# Patient Record
(demographics unavailable — no encounter records)

---

## 2024-11-26 NOTE — PHYSICAL EXAM
[de-identified] : PHYSICAL EXAM  Constitutional:  Appears well, no apparent distress Ability to communicate: Normal Respiratory: non-labored breathing Skin: no rash noted Head: normocephalic, atraumatic Neck: no visible thyroid enlargement Eyes: extraocular movements intact Neurologic: alert and oriented x3 Psychiatric: normal mood, affect, and behavior  Lumbar Spine:  Palpation: right lumbar paraspinal spasm and right lumbar paraspinal tenderness to palpation. ROM: Diminished range of motion in all plains.  Patient notes pain with lateral bending to the right. MMT: Motor exam is 5/5 through out bilateral lower extremities. Sensation: Light touch and pain is intact throughout bilateral lower extremities. Reflexes: achilles and patella reflexes are intact and  symmetrical.  No sustained clonus. Special Testing: Positive straight leg raise on the right side.

## 2024-11-26 NOTE — HISTORY OF PRESENT ILLNESS
[Lower back] : lower back [10] : 10 [5] : 5 [Burning] : burning [Radiating] : radiating [Shooting] : shooting [Tingling] : tingling [Intermittent] : intermittent [Rest] : rest [Standing] : standing [Walking] : walking [FreeTextEntry1] : pt states he is having pain in the right leg , when he walk he states the back of the thigh tightness up and in the lower back it shoots down to the right side  [] : no [FreeTextEntry6] : numbness  [FreeTextEntry7] : right side into the leg , thigh  [de-identified] : driving , getting out of the car

## 2024-11-26 NOTE — PHYSICAL EXAM
[de-identified] : PHYSICAL EXAM  Constitutional:  Appears well, no apparent distress Ability to communicate: Normal Respiratory: non-labored breathing Skin: no rash noted Head: normocephalic, atraumatic Neck: no visible thyroid enlargement Eyes: extraocular movements intact Neurologic: alert and oriented x3 Psychiatric: normal mood, affect, and behavior  Lumbar Spine:  Palpation: right lumbar paraspinal spasm and right lumbar paraspinal tenderness to palpation. ROM: Diminished range of motion in all plains.  Patient notes pain with lateral bending to the right. MMT: Motor exam is 5/5 through out bilateral lower extremities. Sensation: Light touch and pain is intact throughout bilateral lower extremities. Reflexes: achilles and patella reflexes are intact and  symmetrical.  No sustained clonus. Special Testing: Positive straight leg raise on the right side.

## 2024-11-26 NOTE — HISTORY OF PRESENT ILLNESS
[Lower back] : lower back [10] : 10 [5] : 5 [Burning] : burning [Radiating] : radiating [Shooting] : shooting [Tingling] : tingling [Intermittent] : intermittent [Rest] : rest [Standing] : standing [Walking] : walking [FreeTextEntry1] : pt states he is having pain in the right leg , when he walk he states the back of the thigh tightness up and in the lower back it shoots down to the right side  [] : no [FreeTextEntry6] : numbness  [FreeTextEntry7] : right side into the leg , thigh  [de-identified] : driving , getting out of the car

## 2024-12-11 NOTE — HISTORY OF PRESENT ILLNESS
[Lower back] : lower back [10] : 10 [5] : 5 [Burning] : burning [Radiating] : radiating [Shooting] : shooting [Tingling] : tingling [Intermittent] : intermittent [Rest] : rest [Standing] : standing [Walking] : walking [FreeTextEntry1] : pt is following up for mri results, MD will review images and report  [] : no [FreeTextEntry6] : numbness  [FreeTextEntry7] : right side into the leg , thigh  [de-identified] : driving , getting out of the car

## 2024-12-11 NOTE — HISTORY OF PRESENT ILLNESS
[Lower back] : lower back [10] : 10 [5] : 5 [Burning] : burning [Radiating] : radiating [Shooting] : shooting [Tingling] : tingling [Intermittent] : intermittent [Rest] : rest [Standing] : standing [Walking] : walking [FreeTextEntry1] : pt is following up for mri results, MD will review images and report  [] : no [FreeTextEntry6] : numbness  [FreeTextEntry7] : right side into the leg , thigh  [de-identified] : driving , getting out of the car

## 2024-12-11 NOTE — DISCUSSION/SUMMARY
[de-identified] : told pt to do PT and take tylenol prn advised that  he not take meloxicam daily on long term basis

## 2024-12-11 NOTE — DISCUSSION/SUMMARY
[de-identified] : told pt to do PT and take tylenol prn advised that  he not take meloxicam daily on long term basis

## 2024-12-11 NOTE — PHYSICAL EXAM
[de-identified] : PHYSICAL EXAM  Constitutional:  Appears well, no apparent distress Ability to communicate: Normal Respiratory: non-labored breathing Skin: no rash noted Head: normocephalic, atraumatic Neck: no visible thyroid enlargement Eyes: extraocular movements intact Neurologic: alert and oriented x3 Psychiatric: normal mood, affect, and behavior  Lumbar Spine:  Palpation: right lumbar paraspinal spasm and right lumbar paraspinal tenderness to palpation. ROM: Diminished range of motion in all plains.  Patient notes pain with lateral bending to the right. MMT: Motor exam is 5/5 through out bilateral lower extremities. Sensation: Light touch and pain is intact throughout bilateral lower extremities. Reflexes: achilles and patella reflexes are intact and  symmetrical.  No sustained clonus. Special Testing: Positive straight leg raise on the right side.

## 2024-12-11 NOTE — PHYSICAL EXAM
[de-identified] : PHYSICAL EXAM  Constitutional:  Appears well, no apparent distress Ability to communicate: Normal Respiratory: non-labored breathing Skin: no rash noted Head: normocephalic, atraumatic Neck: no visible thyroid enlargement Eyes: extraocular movements intact Neurologic: alert and oriented x3 Psychiatric: normal mood, affect, and behavior  Lumbar Spine:  Palpation: right lumbar paraspinal spasm and right lumbar paraspinal tenderness to palpation. ROM: Diminished range of motion in all plains.  Patient notes pain with lateral bending to the right. MMT: Motor exam is 5/5 through out bilateral lower extremities. Sensation: Light touch and pain is intact throughout bilateral lower extremities. Reflexes: achilles and patella reflexes are intact and  symmetrical.  No sustained clonus. Special Testing: Positive straight leg raise on the right side.

## 2025-03-31 NOTE — PHYSICAL EXAM
[de-identified] : PHYSICAL EXAM  Constitutional:  Appears well, no apparent distress Ability to communicate: Normal Respiratory: non-labored breathing Skin: no rash noted Head: normocephalic, atraumatic Neck: no visible thyroid enlargement Eyes: extraocular movements intact Neurologic: alert and oriented x3 Psychiatric: normal mood, affect, and behavior  Lumbar Spine:  Palpation: right lumbar paraspinal spasm and right lumbar paraspinal tenderness to palpation. ROM: Diminished range of motion in all plains.  Patient notes pain with lateral bending to the right. MMT: Motor exam is 5/5 through out bilateral lower extremities. Sensation: Light touch and pain is intact throughout bilateral lower extremities. Reflexes: achilles and patella reflexes are intact and  symmetrical.  No sustained clonus. Special Testing: Positive straight leg raise on the right side.  Assessemnt: Radiculopathy of lumbosacral region (M54.17) Myalgia (M79.10)

## 2025-03-31 NOTE — HISTORY OF PRESENT ILLNESS
[Lower back] : lower back [10] : 10 [5] : 5 [Burning] : burning [Radiating] : radiating [Shooting] : shooting [Tingling] : tingling [Rest] : rest [Standing] : standing [Walking] : walking [Constant] : constant [Bending forward] : bending forward [FreeTextEntry1] : pt is following up for l spine pain , he is having a hard time bending, and standing  [] : no [FreeTextEntry6] : numbness  [FreeTextEntry7] : right side  [de-identified] : driving , getting out of the car